# Patient Record
Sex: MALE | Race: WHITE | NOT HISPANIC OR LATINO | Employment: FULL TIME | URBAN - METROPOLITAN AREA
[De-identification: names, ages, dates, MRNs, and addresses within clinical notes are randomized per-mention and may not be internally consistent; named-entity substitution may affect disease eponyms.]

---

## 2017-04-25 ENCOUNTER — TRANSCRIBE ORDERS (OUTPATIENT)
Dept: ADMINISTRATIVE | Facility: HOSPITAL | Age: 27
End: 2017-04-25

## 2017-04-25 DIAGNOSIS — R94.5 ABNORMAL RESULTS OF LIVER FUNCTION STUDIES: Primary | ICD-10-CM

## 2017-05-01 ENCOUNTER — HOSPITAL ENCOUNTER (OUTPATIENT)
Dept: ULTRASOUND IMAGING | Facility: HOSPITAL | Age: 27
Discharge: HOME | End: 2017-05-01
Admitting: FAMILY MEDICINE

## 2017-05-01 DIAGNOSIS — R94.5 ABNORMAL RESULTS OF LIVER FUNCTION STUDIES: ICD-10-CM

## 2017-05-01 PROCEDURE — 76700 US EXAM ABDOM COMPLETE: CPT

## 2022-04-29 ENCOUNTER — OFFICE VISIT (OUTPATIENT)
Dept: FAMILY MEDICINE CLINIC | Facility: CLINIC | Age: 32
End: 2022-04-29

## 2022-04-29 VITALS
HEART RATE: 76 BPM | OXYGEN SATURATION: 98 % | BODY MASS INDEX: 30.61 KG/M2 | WEIGHT: 226 LBS | SYSTOLIC BLOOD PRESSURE: 122 MMHG | DIASTOLIC BLOOD PRESSURE: 68 MMHG | HEIGHT: 72 IN

## 2022-04-29 DIAGNOSIS — M54.50 CHRONIC BILATERAL LOW BACK PAIN WITHOUT SCIATICA: ICD-10-CM

## 2022-04-29 DIAGNOSIS — Z00.00 ANNUAL PHYSICAL EXAM: Primary | ICD-10-CM

## 2022-04-29 DIAGNOSIS — Z71.1 CONCERN ABOUT STD IN MALE WITHOUT DIAGNOSIS: ICD-10-CM

## 2022-04-29 DIAGNOSIS — Z88.9 MULTIPLE ALLERGIES: ICD-10-CM

## 2022-04-29 DIAGNOSIS — Z11.59 NEED FOR HEPATITIS C SCREENING TEST: ICD-10-CM

## 2022-04-29 DIAGNOSIS — K64.9 HEMORRHOIDS, UNSPECIFIED HEMORRHOID TYPE: ICD-10-CM

## 2022-04-29 DIAGNOSIS — G89.29 CHRONIC BILATERAL LOW BACK PAIN WITHOUT SCIATICA: ICD-10-CM

## 2022-04-29 DIAGNOSIS — K62.5 BLOOD PER RECTUM: ICD-10-CM

## 2022-04-29 PROBLEM — S83.511A COMPLETE TEAR OF ANTERIOR CRUCIATE LIGAMENT OF RIGHT KNEE: Status: ACTIVE | Noted: 2017-08-21

## 2022-04-29 PROBLEM — U07.1 COVID-19: Status: ACTIVE | Noted: 2022-01-07

## 2022-04-29 PROCEDURE — 99203 OFFICE O/P NEW LOW 30 MIN: CPT

## 2022-04-29 PROCEDURE — 99385 PREV VISIT NEW AGE 18-39: CPT

## 2022-04-29 RX ORDER — TRIAMCINOLONE ACETONIDE 0.25 MG/G
CREAM TOPICAL
COMMUNITY
End: 2022-04-29

## 2022-04-29 RX ORDER — DIAPER,BRIEF,INFANT-TODD,DISP
1 EACH MISCELLANEOUS 2 TIMES DAILY
Qty: 30 G | Refills: 0 | Status: SHIPPED | OUTPATIENT
Start: 2022-04-29

## 2022-04-29 NOTE — PROGRESS NOTES
Subjective   Fortino Reynolds is a 32 y.o. male. 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 No associated symptoms. Mentioned to previous PCP and was given topical tx with no changes.   Sexually active - yes. Sometimes uses protection. With same person.     Not on medications.     Blood through anus  Noticed a few days ago- single episode  No constipation. No changes in stool. No hematochezia, melena.   A little discomfort around anus. No rectal pain  No weight loss.     Does feel pressure in lower back, not new. . Does move heavy objects at work.     Diet: generally healthy. Eats twice a day. Limits sweets and snacks.   Routine work out at least 3 hours a week    States food allergies. Would like allergist referral.   Review of Systems   Constitutional: Negative for fatigue, fever and unexpected weight change.   Respiratory: Negative.    Cardiovascular: Negative.    Gastrointestinal: Positive for anal bleeding. Negative for abdominal distention, abdominal pain, blood in stool, constipation, diarrhea, nausea and rectal pain.   Genitourinary: Negative for difficulty urinating, penile discharge, penile pain, penile swelling, scrotal swelling, testicular pain and urgency.   Musculoskeletal: Positive for back pain. Negative for arthralgias and joint swelling.   Allergic/Immunologic: Positive for food allergies.   Neurological:  "Negative for dizziness and weakness.       Patient Active Problem List   Diagnosis   • Complete tear of anterior cruciate ligament of right knee   • COVID-19   • Penile cellulitis     No past medical history on file.  No past surgical history on file.  No family history on file.  Social History     Socioeconomic History   • Marital status: Single   Tobacco Use   • Smoking status: Never Smoker   • Smokeless tobacco: Never Used   Substance and Sexual Activity   • Alcohol use: Yes     Alcohol/week: 6.0 standard drinks     Types: 6 Standard drinks or equivalent per week   • Drug use: Yes     Types: Marijuana   • Sexual activity: Defer       Allergies   Allergen Reactions   • Diphenhydramine Hcl Swelling   • Diphenhydramine Rash       Current Outpatient Medications on File Prior to Visit   Medication Sig Dispense Refill   • [DISCONTINUED] azithromycin (ZITHROMAX) 250 MG tablet Take 2 tablets the first day, then 1 tablet daily for 4 days. 6 tablet 0   • [DISCONTINUED] cefdinir (OMNICEF) 300 MG capsule Take 1 capsule by mouth 2 (Two) Times a Day. 20 capsule 0   • [DISCONTINUED] triamcinolone (KENALOG) 0.025 % cream triamcinolone acetonide 0.025 % topical cream   APPLY A THIN LAYER TO THE AFFECTED AREA(S) BY TOPICAL ROUTE 2 TIMES PER DAY       No current facility-administered medications on file prior to visit.       Objective   Vitals:    04/29/22 1309   BP: 122/68   Pulse: 76   SpO2: 98%   Weight: 103 kg (226 lb)   Height: 182.9 cm (72\")   PainSc: 0-No pain     Body mass index is 30.65 kg/m².  Physical Exam  Constitutional:       Appearance: Normal appearance. He is not ill-appearing.   Eyes:      Conjunctiva/sclera: Conjunctivae normal.   Neck:      Thyroid: No thyroid mass, thyromegaly or thyroid tenderness.      Vascular: No carotid bruit.   Cardiovascular:      Rate and Rhythm: Normal rate and regular rhythm.      Pulses: Normal pulses.      Heart sounds: Normal heart sounds, S1 normal and S2 normal. No murmur " heard.  Pulmonary:      Effort: Pulmonary effort is normal. No respiratory distress.      Breath sounds: Normal breath sounds.   Abdominal:      General: Bowel sounds are normal. There is no distension.      Palpations: Abdomen is soft.      Tenderness: There is no abdominal tenderness.   Musculoskeletal:      Thoracic back: Normal.      Lumbar back: Normal. No spasms, tenderness or bony tenderness. Normal range of motion. Negative right straight leg raise test and negative left straight leg raise test.      Right hip: No bony tenderness. Normal range of motion.      Left hip: No bony tenderness. Normal range of motion.   Neurological:      General: No focal deficit present.      Mental Status: He is alert and oriented to person, place, and time.      Gait: Gait is intact.   Psychiatric:         Attention and Perception: Attention normal.         Mood and Affect: Mood normal.         Speech: Speech normal.         Behavior: Behavior normal.         Thought Content: Thought content normal.         Cognition and Memory: Cognition normal.         Judgment: Judgment normal.       Assessment/Plan   Diagnoses and all orders for this visit:    1. Annual physical exam (Primary)  -     CBC & Differential  -     Comprehensive Metabolic Panel  -     TSH  -     Hepatitis C Antibody  -          Eat a healthy diet and exercise routinely. Avoid smoking/alcohol and drugs. Use seatbelt 100% of time.     2. Need for hepatitis C screening test  -     Hepatitis C Antibody    3. Concern about STD in male without diagnosis  -     Chlamydia trachomatis, Neisseria gonorrhoeae, PCR - , Urine, Clean Catch  -     HIV-1 / O / 2 Ag / Antibody 4th Generation  -     RPR    4. Multiple allergies  -     Ambulatory Referral to Allergy    5. Blood per rectum        -     Discussed symptoms most likely hemorrhoids vs fissure. Will give hydrocortisone cr to see if helps. Advised if cont to have symptoms to RTC or call.     6. Chronic bilateral low back  pain without sciatica       -     Exam unremarkable. Most likely . Advised good body mechanics at work. Stretching, ice/heat. May use Tylenols vs NSAIDs PRN- do not use regularly.   RTC if new symptoms or fail to improve.              -Follow up: yearly for annual. Sooner as needed.      - Pt agrees with plan of care and states no further concerns or questions today    This document is intended for medical expert use only. Reading of this document by patients and/or patient's family without participating medical staff guidance may result in misinterpretation and unintended morbidity.  Any interpretation of such data is the responsibility of the patient and/or family member responsible for the patient in concert with their primary or specialist providers, not to be left for sources of online searches such as [employer], [place] or similar queries. Relying on these approaches to knowledge may result in misinterpretation, misguided goals of care and even death should patients or family members try recommendations outside of the realm of professional medical care in a supervised way.     Please allow 3-5 business days for recommendations based on new results     Go to the ER for any possible lifethreatening symptoms such as chest pain or shortness of air.      I personally spent 30 minutes with this patient, preparing for the visit, reviewing tests, obtaining and/or reviewing a separately obtained history, performing a medically appropriate examination and/or evaluation , counseling and educating the patient/family/caregiver, ordering medications, tests, or procedures, documenting information in the medical record and independently interpreting results.

## 2022-05-02 LAB
ALBUMIN SERPL-MCNC: 4.9 G/DL (ref 4.1–5.2)
ALBUMIN/GLOB SERPL: 2.2 {RATIO} (ref 1.2–2.2)
ALP SERPL-CCNC: 54 IU/L (ref 44–121)
ALT SERPL-CCNC: 27 IU/L (ref 0–44)
AST SERPL-CCNC: 30 IU/L (ref 0–40)
BASOPHILS # BLD AUTO: 0.1 X10E3/UL (ref 0–0.2)
BASOPHILS NFR BLD AUTO: 1 %
BILIRUB SERPL-MCNC: 1.5 MG/DL (ref 0–1.2)
BUN SERPL-MCNC: 15 MG/DL (ref 6–20)
BUN/CREAT SERPL: 15 (ref 9–20)
C TRACH RRNA SPEC QL NAA+PROBE: NEGATIVE
CALCIUM SERPL-MCNC: 9.6 MG/DL (ref 8.7–10.2)
CHLORIDE SERPL-SCNC: 101 MMOL/L (ref 96–106)
CO2 SERPL-SCNC: 23 MMOL/L (ref 20–29)
CREAT SERPL-MCNC: 1.02 MG/DL (ref 0.76–1.27)
EGFRCR SERPLBLD CKD-EPI 2021: 101 ML/MIN/1.73
EOSINOPHIL # BLD AUTO: 0.2 X10E3/UL (ref 0–0.4)
EOSINOPHIL NFR BLD AUTO: 3 %
ERYTHROCYTE [DISTWIDTH] IN BLOOD BY AUTOMATED COUNT: 12.1 % (ref 11.6–15.4)
GLOBULIN SER CALC-MCNC: 2.2 G/DL (ref 1.5–4.5)
GLUCOSE SERPL-MCNC: 78 MG/DL (ref 65–99)
HCT VFR BLD AUTO: 43.1 % (ref 37.5–51)
HCV AB S/CO SERPL IA: <0.1 S/CO RATIO (ref 0–0.9)
HGB BLD-MCNC: 14.8 G/DL (ref 13–17.7)
HIV 1+2 AB+HIV1 P24 AG SERPL QL IA: NON REACTIVE
IMM GRANULOCYTES # BLD AUTO: 0 X10E3/UL (ref 0–0.1)
IMM GRANULOCYTES NFR BLD AUTO: 0 %
LYMPHOCYTES # BLD AUTO: 1.7 X10E3/UL (ref 0.7–3.1)
LYMPHOCYTES NFR BLD AUTO: 25 %
MCH RBC QN AUTO: 33.3 PG (ref 26.6–33)
MCHC RBC AUTO-ENTMCNC: 34.3 G/DL (ref 31.5–35.7)
MCV RBC AUTO: 97 FL (ref 79–97)
MONOCYTES # BLD AUTO: 0.5 X10E3/UL (ref 0.1–0.9)
MONOCYTES NFR BLD AUTO: 7 %
N GONORRHOEA RRNA SPEC QL NAA+PROBE: NEGATIVE
NEUTROPHILS # BLD AUTO: 4.2 X10E3/UL (ref 1.4–7)
NEUTROPHILS NFR BLD AUTO: 64 %
PLATELET # BLD AUTO: 239 X10E3/UL (ref 150–450)
POTASSIUM SERPL-SCNC: 4.4 MMOL/L (ref 3.5–5.2)
PROT SERPL-MCNC: 7.1 G/DL (ref 6–8.5)
RBC # BLD AUTO: 4.45 X10E6/UL (ref 4.14–5.8)
RPR SER QL: NON REACTIVE
SODIUM SERPL-SCNC: 143 MMOL/L (ref 134–144)
TSH SERPL DL<=0.005 MIU/L-ACNC: 0.43 UIU/ML (ref 0.45–4.5)
WBC # BLD AUTO: 6.5 X10E3/UL (ref 3.4–10.8)

## 2022-05-03 LAB
Lab: NORMAL
T3FREE SERPL-MCNC: 3.6 PG/ML (ref 2–4.4)
T4 FREE SERPL-MCNC: 1.48 NG/DL (ref 0.82–1.77)
WRITTEN AUTHORIZATION: NORMAL